# Patient Record
Sex: FEMALE | Race: WHITE | Employment: FULL TIME | URBAN - METROPOLITAN AREA
[De-identification: names, ages, dates, MRNs, and addresses within clinical notes are randomized per-mention and may not be internally consistent; named-entity substitution may affect disease eponyms.]

---

## 2021-06-14 ENCOUNTER — APPOINTMENT (OUTPATIENT)
Dept: PHYSICAL THERAPY | Age: 66
End: 2021-06-14
Payer: COMMERCIAL

## 2021-06-30 ENCOUNTER — HOSPITAL ENCOUNTER (OUTPATIENT)
Dept: PHYSICAL THERAPY | Age: 66
Discharge: HOME | End: 2021-06-30
Payer: COMMERCIAL

## 2021-06-30 PROCEDURE — 97110 THERAPEUTIC EXERCISES: CPT

## 2021-06-30 PROCEDURE — 97162 PT EVAL MOD COMPLEX 30 MIN: CPT

## 2021-06-30 PROCEDURE — 97140 MANUAL THERAPY 1/> REGIONS: CPT

## 2021-06-30 NOTE — PROGRESS NOTES
PT DAILY TREATMENT NOTE    Patient Name: Evy Domínguez  Date:2021  : 1955  [x]  Patient  Verified  Payor: BLUE CROSS / Plan: Mary Lopes [id number] PPO / Product Type: PPO /    In time:1225  Out time:126  Total Treatment Time (min): 61  Total Timed Codes (min): 25  1:1 Treatment Time ( only): 61   Visit #: 1 of     Treatment Area: Abdominal weakness [R19.8]  Rectus diastasis [M62.08]    SUBJECTIVE  Pain Level (0-10 scale): 2  Any medication changes, allergies to medications, adverse drug reactions, diagnosis change, or new procedure performed?: [x] No    [] Yes (see summary sheet for update)  Subjective functional status/changes:   [] No changes reported  Pt initial eval see POC for details    OBJECTIVE      36 min [x]Eval                  []Re-Eval       13 min Therapeutic Exercise:  [] See flow sheet :   Rationale: increase ROM and increase strength to improve the patient's ability to lift, and transfer    12 min Manual Therapy:  stm to JONES lumbar paraspinals   The manual therapy interventions were performed at a separate and distinct time from the therapeutic activities interventions. Rationale: decrease pain, increase ROM and increase tissue extensibility to improve tissue excursion during functional mobility and ADLs              With   [] TE   [] TA   [] neuro   [] other: Patient Education: [x] Review HEP    [] Progressed/Changed HEP based on:   [] positioning   [] body mechanics   [] transfers   [] heat/ice application    [] other:      Other Objective/Functional Measures:    Justification for Eval Code Complexity:  Patient History : see POC   Examination see exam   Clinical Presentation: evolving  Clinical Decision Making : FOTO : 54/100    Pain Level (0-10 scale) post treatment: 1    ASSESSMENT/Changes in Function: Pt was instructed in initial HEP required demo, vc, and tc for all exercises to perform correctly.  Pt was given hand out detailing exercises and instructed in modification of exercises to tolerance, and in performing exercises safely. Pt verbalized understanding. Patient will continue to benefit from skilled PT services to modify and progress therapeutic interventions, address functional mobility deficits, address ROM deficits, address strength deficits, analyze and address soft tissue restrictions, analyze and cue movement patterns, analyze and modify body mechanics/ergonomics, assess and modify postural abnormalities, address imbalance/dizziness and instruct in home and community integration to attain remaining goals.      [x]  See Plan of Care  []  See progress note/recertification  []  See Discharge Summary         Progress towards goals / Updated goals:  No progress as goals were set today    PLAN  []  Upgrade activities as tolerated     []  Continue plan of care  []  Update interventions per flow sheet       []  Discharge due to:_  []  Other:_        Yuridia Foster 6/30/2021  9:12 AM    Future Appointments   Date Time Provider Andria Parrish   6/30/2021 12:15 PM [facility]

## 2021-06-30 NOTE — PROGRESS NOTES
[address] [facility] AT [address] [place], [place]  Phone: [phone] Fax: [phone] / STATEMENT OF MEDICAL NECESSITY FOR PHYSICAL THERAPY SERVICES  Patient Name: Lionel Granado : 1955   Medical   Diagnosis: Abdominal weakness [R19.8]  Rectus diastasis [M62.08] Treatment Diagnosis: Low back pain [M54.5]  Abdominal weakness [R19.8]  Rectus diastasis [M62.08]   Onset Date: ~     Referral Source: Mario Alberto Sinclair MD [facility]): 2021   Prior Hospitalization: See medical history Provider #: [id number]   Prior Level of Function: I in all functional mobility and ADLs   Comorbidities: , hx of breast cancer,    Medications: Verified on Patient Summary List   The Plan of Care and following information is based on the information from the initial evaluation.   ========================================================================    Assessment / key information:  Pt is a 78 y.o. F who presents with low back pain and diastasis recti. Current deficits include: dec hip ROM, dec hip strength, dec spinal ROM, dec dynamic hip and core stability, significant abdominal seperation Functional deficits include: impaired floor transfers, impaired sitting, impaired lifting, impaired ability to participate in exercise/recreational activity. FOTO score indicates 46% functional impairment. Home exercise program initiated on initial evaluation to address these deficits. Pt would benefit from PT to address these deficits for increased functional mobility and QOL. Subjective: Pt reports she was diagnosed with diastasis recti may years ago, and back then they told her not to do anything that caused strain in her abdomen. She feels it started after her first pregnancy. She has a history of 5 pregnancies and 4 live births, with most recent being 25 years ago.  She had a tubal pregnancy in  which resulted in an abdominal surgery. She states all her babies were large (10 lbs). She noticed her abdominals  then, but back then it was just recommended to avoid anything that caused strain in the abdomen. She saw her MD recently who told her that now they recommend doing therapy. She does not want to do surgery at this point if possible. It is worse with sitting up straight, it can be difficult to void her bowels and painful if her bowels are full, it makes it impossible to get up off the ground, and she notes soreness in her lower back. The back pain is worse with increased activity, she denies numbness/tingling/leg pain. She feels the back pain is related to the abdominal separation. She currently has no incontinence issues currently but is worried about having them. She is somewhat sedentary, working full time as a professor at [employer].  She denies all red flags      OBJECTIVE:    PAIN:  Location- low back, abdominals  Current-2  Best-0  Worst-4-6  Alleviating factors: rest  Aggravating factors: inc activity    MMT:  Hip   -flex L=3/5 R=3/5  -ext L=3-/5 R=3-/5  -abd L=3-/5 R=3/5    Core bridge: 25% ROM  Transverse abdominis: fair activation in supine with 3-5s endurance, poor activation in sidelying with noted abdominal bulging  Diastasis recti: significant abdominal separation greatest width 15cm, length extending from lower ribs to belt line ~25cm, noted abdominal doming at separation with headlift     Sensation: light touch grossly intact    Posture: forward head, rounded jones elevated shoulders, mild inc thoracic kyphosis, mild flattened lumbar lordosis    Balance: SLS JONES WNL, inc postural sway with L SLS    AROM:  Lumbar  Flexion- finger tips to foot, no reversal of lumbar lordosis  Extension- WNL  Rotation R- WNL  Rotation L- WNL  Sidebend R- fingertips to knee jt line, mild back pain  Sidebend L-fingertips to knee jt line, mild back pain  Hip  Extension- R=6deg, L=0deg    Outcome Measure: FOTO=54    Palpation for Tenderness: TTP of ema lumbar paraspinals, QL  Segmental mobility: Dec segmental mobility from T8-L1, dec lumbar fascial mobiltiy    Special Tests:  SLR- negative   Slump Test- negative    ========================================================================  Eval Complexity: History: MEDIUM  Complexity : 1-2 comorbidities / personal factors will impact the outcome/ POC Exam:MEDIUM Complexity : 3 Standardized tests and measures addressing body structure, function, activity limitation and / or participation in recreation  Presentation: MEDIUM Complexity : Evolving with changing characteristics  Clinical Decision Making:MEDIUM Complexity : FOTO score of 26-74Overall Complexity:MEDIUM  Problem List: pain affecting function, decrease ROM, decrease strength, impaired gait/ balance, decrease ADL/ functional abilitiies, decrease activity tolerance, decrease flexibility/ joint mobility and decrease transfer abilities   Treatment Plan may include any combination of the following: Therapeutic exercise, Therapeutic activities, Neuromuscular re-education, Physical agent/modality, Gait/balance training, Manual therapy, Patient education, Self Care training, Functional mobility training, Home safety training and Stair training  Patient / Family readiness to learn indicated by: asking questions  Persons(s) to be included in education: patient (P)  Barriers to Learning/Limitations: None  Measures taken:    Patient Goal (s): \"strengthen core\"   Patient self reported health status: fair  Rehabilitation Potential: fair  Short Term Goals: To be accomplished in 1 weeks:  1) Goal: Patient will be independent and compliant with HEP in order to progress toward long term goals. Status at last note/certification: issued and reviewed  Long Term Goals: To be accomplished in 8-12 treatments:  1) Goal: Patient will improve FOTO assessment score to 61 pts in order to indicate improved functional abilities.   Status at last note/certification: 54 pts  2) Goal: Patient will improve JONES hip extension to 20 deg for improved posture and gait mechanics  Status at last note/certification:  V=4KDQ, L=0deg  3) Goal: Patient will report worst back pain as 1/10 or less in order to progress toward personal goals. Status at last note/certification: 5-5/81  4) Goal: Patient will report overall at least 65% improvement in function in order to progress toward premorbid status. Status at last note/certification: n/a  5) Goal: Patient will demonstrate ability to activate transverse abdominis for 10s in quadruped for improved core stability  Status at last note/certification:unable    Frequency / Duration:   Patient to be seen  1-2  times per week for 4-6  weeks:  Patient / Caregiver education and instruction: exercises  Therapist Signature: John Pepe Date: 6/64/8379   Certification Period: na Time: 2:04 PM   ========================================================================  I certify that the above Physical Therapy Services are being furnished while the patient is under my care. I agree with the treatment plan and certify that this therapy is necessary. Physician Signature:        Date:       Time: ___                                            Sarahy Anderson MD.    Please sign and return to [facility] or you may fax the signed copy to [phone]. Thank you.

## 2021-07-07 ENCOUNTER — HOSPITAL ENCOUNTER (OUTPATIENT)
Dept: PHYSICAL THERAPY | Age: 66
Discharge: HOME | End: 2021-07-07
Payer: COMMERCIAL

## 2021-07-07 PROCEDURE — 97110 THERAPEUTIC EXERCISES: CPT | Performed by: GENERAL ACUTE CARE HOSPITAL

## 2021-07-07 PROCEDURE — 97112 NEUROMUSCULAR REEDUCATION: CPT | Performed by: GENERAL ACUTE CARE HOSPITAL

## 2021-07-07 NOTE — PROGRESS NOTES
PT DAILY TREATMENT NOTE     Patient Name: Bridger Lai  Date:2021  : 1955  [x]  Patient  Verified  Payor: BLUE CROSS / Plan: [facility] PPO / Product Type: PPO /    In time:2:00  Out time: 2:56  Total Treatment Time (min): 56  Total Timed Codes (min): 56  1:1 Treatment Time (min): 56   Visit #: 2 of     Treatment Area: Abdominal weakness [R19.8]  Rectus diastasis [M62.08]    SUBJECTIVE  Pain Level (0-10 scale): 0/10  Any medication changes, allergies to medications, adverse drug reactions, diagnosis change, or new procedure performed?: [x] No    [] Yes (see summary sheet for update)  Subjective functional status/changes:   [] No changes reported  Pt has initiated exercises without complaint. OBJECTIVE      31 min Therapeutic Exercise:  [] See flow sheet :   Rationale: increase ROM and increase strength to improve the patients ability to transfer, lift, squat       25 min Neuromuscular Re-education:  []  See flow sheet :   Rationale: increase strength, improve coordination, improve balance and increase proprioception  to improve the patients ability to maintain core stabilization during functional mobility tasks    W/ TE min Patient Education: [x] Review HEP    [] Progressed/Changed HEP based on:   Education throughout session on core strengthening, engagement of TA and obliques, avoiding diastasis protrusion/bulging     Other Objective/Functional Measures:    First FU treatment - initiated exercises per FS    Pain Level (0-10 scale) post treatment: 0/10    ASSESSMENT/Changes in Function: Good tolerance to initial treatment session with patient requiring 100% VC's for all newly introduced exercises. Heavy emphasis on TA contraction during all therex. Pt asking about use of an abdominal binder in order to perform higher level core exercises without diastasis protrusion, but educated on underlying concern of lack of adequate TA strength.  Pt will benefit from ongoing core and hip strengthening with further education as needed. Patient will continue to benefit from skilled PT services to modify and progress therapeutic interventions, address functional mobility deficits, address ROM deficits, address strength deficits, analyze and address soft tissue restrictions, analyze and cue movement patterns, analyze and modify body mechanics/ergonomics, assess and modify postural abnormalities, address imbalance/dizziness and instruct in home and community integration to attain remaining goals. [x]  See Plan of Care  []  See progress note/recertification  []  See Discharge Summary         Progress towards goals / Updated goals:  Short Term Goals: To be accomplished in 1 weeks:  1) Goal: Patient will be independent and compliant with HEP in order to progress toward long term goals. Status at last note/certification: issued and reviewed  Current: pt has initiated HEP (7/7/21)  Long Term Goals: To be accomplished in 8-12 treatments:  1) Goal: Patient will improve FOTO assessment score to 61 pts in order to indicate improved functional abilities. Status at last note/certification: 54 pts  2) Goal: Patient will improve JONES hip extension to 20 deg for improved posture and gait mechanics  Status at last note/certification:  [id number], L=0deg  3) Goal: Patient will report worst back pain as 1/10 or less in order to progress toward personal goals. Status at last note/certification: 8-7/29  4) Goal: Patient will report overall at least 65% improvement in function in order to progress toward premorbid status.   Status at last note/certification: n/a  5) Goal: Patient will demonstrate ability to activate transverse abdominis for 10s in quadruped for improved core stability  Status at last note/certification:unable    PLAN  [x]  Upgrade activities as tolerated     []  Continue plan of care  []  Update interventions per flow sheet       []  Discharge due to:_  [x]  Other:_  Progress as tolerated, update HEP     Sharon Hyde, PT 7/7/2021  8:52 AM

## 2021-07-12 ENCOUNTER — HOSPITAL ENCOUNTER (OUTPATIENT)
Dept: PHYSICAL THERAPY | Age: 66
Discharge: HOME | End: 2021-07-12
Payer: COMMERCIAL

## 2021-07-12 PROCEDURE — 97112 NEUROMUSCULAR REEDUCATION: CPT

## 2021-07-12 PROCEDURE — 97110 THERAPEUTIC EXERCISES: CPT

## 2021-07-12 PROCEDURE — 97140 MANUAL THERAPY 1/> REGIONS: CPT

## 2021-07-12 NOTE — PROGRESS NOTES
PT DAILY TREATMENT NOTE     Patient Name: Sugey Rodríguez  Date:2021  : 1955  [x]  Patient  Verified  Payor: BLUE ADRIANA / Plan: Mary Lopes [id number] PPO / Product Type: PPO /    In time: 9437 Out time: 1139  Total Treatment Time (min): 54  Total Timed Codes (min): 54  1:1 Treatment Time (min): 54  Visit #: 3 of     Treatment Area: Abdominal weakness [R19.8]  Rectus diastasis [M62.08]    SUBJECTIVE  Pain Level (0-10 scale): 0/10  Any medication changes, allergies to medications, adverse drug reactions, diagnosis change, or new procedure performed?: [x] No    [] Yes (see summary sheet for update)  Subjective functional status/changes:   [] No changes reported  Pt reports she has been working on her exercises at home and practicing better posture as she is able    OBJECTIVE      21 min Therapeutic Exercise:  [] See flow sheet :   Rationale: increase ROM and increase strength to improve the patients ability to transfer, lift, squat       23 min Neuromuscular Re-education:  []  See flow sheet :   Rationale: increase strength, improve coordination, improve balance and increase proprioception  to improve the patients ability to maintain core stabilization during functional mobility tasks    10 min Manual Therapy:  STM to ema lumbar paraspinals, skin rolling to L lumbar quadrant, Gr III P/A upglides from T8-12 to improve thoracic extension   The manual therapy interventions were performed at a separate and distinct time from the therapeutic activities interventions. Rationale: increase ROM and increase tissue extensibility to improve postural alignment      W/ TE min Patient Education: [x] Review HEP    [] Progressed/Changed HEP based on:   Education throughout session on core strengthening, engagement of TA and obliques, avoiding diastasis protrusion/bulging     Other Objective/Functional Measures:     Added prone hip ext    Pain Level (0-10 scale) post treatment: 0/10    ASSESSMENT/Changes in Function:    Pt was able to tolerate exercises from last visit without complaint. She was also instructed in positional TA strength progression of going from supine to prone, I.e. gravity assisted to gravity resisted. Pt verbalized understanding. She demonstrated improved TA contraction and control since initial eval and is progressing well. PT considering adding quadruped exercises NV    Patient will continue to benefit from skilled PT services to modify and progress therapeutic interventions, address functional mobility deficits, address ROM deficits, address strength deficits, analyze and address soft tissue restrictions, analyze and cue movement patterns, analyze and modify body mechanics/ergonomics, assess and modify postural abnormalities, address imbalance/dizziness and instruct in home and community integration to attain remaining goals. [x]  See Plan of Care  []  See progress note/recertification  []  See Discharge Summary         Progress towards goals / Updated goals:  Short Term Goals: To be accomplished in 1 weeks:  1) Goal: Patient will be independent and compliant with HEP in order to progress toward long term goals. Status at last note/certification: issued and reviewed  Current: pt has initiated HEP (7/7/21)  Long Term Goals: To be accomplished in 8-12 treatments:  1) Goal: Patient will improve FOTO assessment score to 61 pts in order to indicate improved functional abilities. Status at last note/certification: 54 pts  2) Goal: Patient will improve JONES hip extension to 20 deg for improved posture and gait mechanics  Status at last note/certification:  O=2KIG, L=0deg added prone hip ext today 7/12/21  3) Goal: Patient will report worst back pain as 1/10 or less in order to progress toward personal goals. Status at last note/certification: 1-2/59  4) Goal: Patient will report overall at least 65% improvement in function in order to progress toward premorbid status.   Status at last note/certification: n/a  5) Goal: Patient will demonstrate ability to activate transverse abdominis for 10s in quadruped for improved core stability  Status at last note/certification:unable    PLAN  [x]  Upgrade activities as tolerated     []  Continue plan of care  []  Update interventions per flow sheet       []  Discharge due to:_  [x]  Other:_  Progress as tolerated, update HEP RAUL Durham Mouse 7/12/2021  12:59 PM    Future Appointments   Date Time Provider Andria Parrish   7/15/2021  2:00 PM [facility]   7/20/2021  2:45 PM [facility] PT [facility]   7/22/2021  2:00 PM Altamese Camel, PT [facility]   7/27/2021  3:30 PM [facility] PT [facility]   7/29/2021  2:00 PM Altamese Camel, PT [facility]   8/3/2021  2:00 PM [facility] PT [facility]   8/5/2021  2:00 PM [facility] PT [facility]   8/10/2021  2:00 PM [facility] PT [facility]   8/12/2021  2:00 PM [facility] PT [facility]

## 2021-07-15 ENCOUNTER — HOSPITAL ENCOUNTER (OUTPATIENT)
Dept: PHYSICAL THERAPY | Age: 66
Discharge: HOME | End: 2021-07-15
Payer: COMMERCIAL

## 2021-07-15 PROCEDURE — 97110 THERAPEUTIC EXERCISES: CPT | Performed by: GENERAL ACUTE CARE HOSPITAL

## 2021-07-15 PROCEDURE — 97112 NEUROMUSCULAR REEDUCATION: CPT | Performed by: GENERAL ACUTE CARE HOSPITAL

## 2021-07-15 NOTE — PROGRESS NOTES
PT DAILY TREATMENT NOTE     Patient Name: Dominik Esquivel  Date:7/15/2021  : 1955  [x]  Patient  Verified  Payor: BLUE CROSS / Plan: [facility] PPO / Product Type: PPO /    In time: 2:00   Out time: 2:48  Total Treatment Time (min): 48  Total Timed Codes (min): 48  1:1 Treatment Time (min): 48   Visit #: 4 of     Treatment Area: Abdominal weakness [R19.8]  Rectus diastasis [M62.08]    SUBJECTIVE  Pain Level (0-10 scale): 0/10   Any medication changes, allergies to medications, adverse drug reactions, diagnosis change, or new procedure performed?: [x] No    [] Yes (see summary sheet for update)  Subjective functional status/changes:   [] No changes reported  Pt reports no issues at this time, HEP going well. Ongoing complaint of L sided LBP. OBJECTIVE      25 min Therapeutic Exercise:  [] See flow sheet :   Rationale: increase ROM and increase strength to improve the patients ability to transfer, lift, squat       23 min Neuromuscular Re-education:  []  See flow sheet :   Rationale: increase strength, improve coordination, improve balance and increase proprioception  to improve the patients ability to maintain core stabilization during functional mobility tasks    NI min Manual Therapy:  STM to ema lumbar paraspinals, skin rolling to L lumbar quadrant, Gr III P/A upglides from T8-12 to improve thoracic extension   The manual therapy interventions were performed at a separate and distinct time from the therapeutic activities interventions. Rationale: increase ROM and increase tissue extensibility to improve postural alignment      W/ TE min Patient Education: [x] Review HEP    [] Progressed/Changed HEP based on: Other Objective/Functional Measures: Added 3way SB roll out, QP TA, QP arm/leg lifts --> birddog, wall ball squats with GTB at knees.      Pain Level (0-10 scale) post treatment: 0/10     ASSESSMENT/Changes in Function:   Good tolerance to progression of exercises today for core and hip strengthening. VC/TC to correct sidelying hip ABD for full sidelying (versus up on elbow) and bringing leg back to prevent TFL compensation. Added lumbar stretching to address co L sided LS irritation. Improving TA contraction during dynamic tasks. Pt has begun going the gym for additional exercise. Patient will continue to benefit from skilled PT services to modify and progress therapeutic interventions, address functional mobility deficits, address ROM deficits, address strength deficits, analyze and address soft tissue restrictions, analyze and cue movement patterns, analyze and modify body mechanics/ergonomics, assess and modify postural abnormalities, address imbalance/dizziness and instruct in home and community integration to attain remaining goals. [x]  See Plan of Care  []  See progress note/recertification  []  See Discharge Summary         Progress towards goals / Updated goals:  Short Term Goals: To be accomplished in 1 weeks:  1) Goal: Patient will be independent and compliant with HEP in order to progress toward long term goals. Status at last note/certification: issued and reviewed  Current: pt has initiated HEP (7/7/21)  Long Term Goals: To be accomplished in 8-12 treatments:  1) Goal: Patient will improve FOTO assessment score to 61 pts in order to indicate improved functional abilities. Status at last note/certification: 54 pts  2) Goal: Patient will improve JONES hip extension to 20 deg for improved posture and gait mechanics  Status at last note/certification:  N=9YNR, L=0deg added prone hip ext today 7/12/21  3) Goal: Patient will report worst back pain as 1/10 or less in order to progress toward personal goals. Status at last note/certification: 0-0/29  4) Goal: Patient will report overall at least 65% improvement in function in order to progress toward premorbid status.   Status at last note/certification: n/a  5) Goal: Patient will demonstrate ability to activate transverse abdominis for 10s in quadruped for improved core stability  Status at last note/certification:unable    PLAN  [x]  Upgrade activities as tolerated     []  Continue plan of care  []  Update interventions per flow sheet       []  Discharge due to:_  []  Other:_        Deepthi Cortez, PT 7/15/2021  12:59 PM    Future Appointments   Date Time Provider Andria Parrish   7/15/2021  2:00 PM [address] 2 [facility] [address]   7/20/2021  2:45 PM [address] PT [facility] 2 [facility] [address]   7/22/2021  2:00 PM Rhodia Donavon, PT [facility] [address]   7/27/2021  3:30 PM [address] PT [facility] 2 [facility] [address]   7/29/2021  2:00 PM Rhodia Donavon, PT [facility] [address]   8/3/2021  2:00 PM [address] PT [facility] 2 [facility] [address]   8/5/2021  2:00 PM [address] PT [facility] 2 [facility] [address]   8/10/2021  2:00 PM [address] PT [facility] 2 [facility] [address]   8/12/2021  2:00 PM [address] PT [facility] 2 [facility] [address]

## 2021-07-20 ENCOUNTER — HOSPITAL ENCOUNTER (OUTPATIENT)
Dept: PHYSICAL THERAPY | Age: 66
Discharge: HOME | End: 2021-07-20
Payer: COMMERCIAL

## 2021-07-20 PROCEDURE — 97140 MANUAL THERAPY 1/> REGIONS: CPT | Performed by: GENERAL ACUTE CARE HOSPITAL

## 2021-07-20 PROCEDURE — 97110 THERAPEUTIC EXERCISES: CPT | Performed by: GENERAL ACUTE CARE HOSPITAL

## 2021-07-20 PROCEDURE — 97112 NEUROMUSCULAR REEDUCATION: CPT | Performed by: GENERAL ACUTE CARE HOSPITAL

## 2021-07-20 NOTE — PROGRESS NOTES
PT DAILY TREATMENT NOTE     Patient Name: Sharri Crane  Date:2021  : 1955  [x]  Patient  Verified  Payor: BLUE CROSS / Plan: [facility] [id number] PPO / Product Type: PPO /    In time:  2:45   Out time: 3:24   Total Treatment Time (min): 39  Total Timed Codes (min): 39  1:1 Treatment Time (min): 39  Visit #: 5 of     Treatment Area: Abdominal weakness [R19.8]  Rectus diastasis [M62.08]    SUBJECTIVE  Pain Level (0-10 scale): 510    Any medication changes, allergies to medications, adverse drug reactions, diagnosis change, or new procedure performed?: [x] No    [] Yes (see summary sheet for update)  Subjective functional status/changes:   [] No changes reported  Pt reports significant muscle soreness in legs and back after several days of working in the yard. OBJECTIVE      8 min Therapeutic Exercise:  [] See flow sheet :   Rationale: increase ROM and increase strength to improve the patients ability to transfer, lift, squat       15 min Neuromuscular Re-education:  []  See flow sheet : core re-ed   Rationale: increase strength, improve coordination, improve balance and increase proprioception  to improve the patients ability to maintain core stabilization during functional mobility tasks    15 min Manual Therapy: STM to ema lumbar paraspinals f/b massage gun to LS paraspinals, upper glutes, thoracic parapsinals, periscapular mm    The manual therapy interventions were performed at a separate and distinct time from the therapeutic activities interventions. Rationale: increase ROM and increase tissue extensibility to improve postural alignment      W/ TE min Patient Education: [x] Review HEP    [] Progressed/Changed HEP based on: Other Objective/Functional Measures:        Pain Level (0-10 scale) post treatment: 4/10    ASSESSMENT/Changes in Function:   Modified today's session secondary to complaints of significant muscle soreness today.  Pt unable to tolerate the majority of usual therex. Continued to focus on supine TA strengthening as tolerated. Pt reported positive response to manual therapy to reduce muscle stiffness. Plan to resume prior exercise routine next visit per tolerance. Patient will continue to benefit from skilled PT services to modify and progress therapeutic interventions, address functional mobility deficits, address ROM deficits, address strength deficits, analyze and address soft tissue restrictions, analyze and cue movement patterns, analyze and modify body mechanics/ergonomics, assess and modify postural abnormalities, address imbalance/dizziness and instruct in home and community integration to attain remaining goals. [x]  See Plan of Care  []  See progress note/recertification  []  See Discharge Summary         Progress towards goals / Updated goals:  Short Term Goals: To be accomplished in 1 weeks:  1) Goal: Patient will be independent and compliant with HEP in order to progress toward long term goals. Status at last note/certification: issued and reviewed  Current: pt has initiated HEP (7/7/21)  Long Term Goals: To be accomplished in 8-12 treatments:  1) Goal: Patient will improve FOTO assessment score to 61 pts in order to indicate improved functional abilities. Status at last note/certification: 54 pts  2) Goal: Patient will improve JONES hip extension to 20 deg for improved posture and gait mechanics  Status at last note/certification:  Q=7UDU, L=0deg added prone hip ext today 7/12/21  3) Goal: Patient will report worst back pain as 1/10 or less in order to progress toward personal goals. Status at last note/certification: 8-9/02  4) Goal: Patient will report overall at least 65% improvement in function in order to progress toward premorbid status.   Status at last note/certification: n/a  5) Goal: Patient will demonstrate ability to activate transverse abdominis for 10s in quadruped for improved core stability  Status at last note/certification:unable    PLAN  [x]  Upgrade activities as tolerated     []  Continue plan of care  []  Update interventions per flow sheet       []  Discharge due to:_  [x]  Other:_  Resume core and hip strengthening RAUL Aranda PT 7/20/2021  12:59 PM    Future Appointments   Date Time Provider Andria Parrish   7/20/2021  2:45 PM [address] PT [facility] 2 [facility] [address]   7/22/2021  2:00 PM Gab Rosales PT [facility] [address]   7/27/2021  3:30 PM [address] PT [facility] 2 [facility] [address]   7/29/2021  2:00 PM Gab Rosales PT [facility] [address]   8/3/2021  2:00 PM [address] PT [facility] 2 [facility] [address]   8/5/2021  2:00 PM [address] PT [facility] 2 [facility] [address]   8/10/2021  2:00 PM [address] PT [facility] 2 [facility] [address]   8/12/2021  2:00 PM [address] PT [facility] 2 [facility] [address]

## 2021-07-22 ENCOUNTER — HOSPITAL ENCOUNTER (OUTPATIENT)
Dept: PHYSICAL THERAPY | Age: 66
End: 2021-07-22
Payer: COMMERCIAL

## 2021-07-27 ENCOUNTER — APPOINTMENT (OUTPATIENT)
Dept: PHYSICAL THERAPY | Age: 66
End: 2021-07-27
Payer: COMMERCIAL

## 2021-07-29 ENCOUNTER — APPOINTMENT (OUTPATIENT)
Dept: PHYSICAL THERAPY | Age: 66
End: 2021-07-29
Payer: COMMERCIAL

## 2021-08-03 ENCOUNTER — APPOINTMENT (OUTPATIENT)
Dept: PHYSICAL THERAPY | Age: 66
End: 2021-08-03

## 2021-08-05 ENCOUNTER — APPOINTMENT (OUTPATIENT)
Dept: PHYSICAL THERAPY | Age: 66
End: 2021-08-05

## 2021-08-10 ENCOUNTER — APPOINTMENT (OUTPATIENT)
Dept: PHYSICAL THERAPY | Age: 66
End: 2021-08-10

## 2021-08-12 ENCOUNTER — APPOINTMENT (OUTPATIENT)
Dept: PHYSICAL THERAPY | Age: 66
End: 2021-08-12

## 2021-09-21 NOTE — PROGRESS NOTES
[facility] AT [address], [place], [place]  Phone: [phone] Fax 21 [phone] SUMMARY  Patient Name: Amisha Dyer : 1955   Treatment/Medical Diagnosis: Abdominal weakness [R19.8]  Rectus diastasis [M62.08]   Referral Source: Kylee Zhou MD     Date of Initial Visit: 21 Attended Visits: 5 Missed Visits: 0     SUMMARY OF TREATMENT   Pt is a 78 y.o. F who presents with low back pain and diastasis recti. Treatment Plan may include any combination of the following: Therapeutic exercise, Therapeutic activities, Neuromuscular re-education, Physical agent/modality, Gait/balance training, Manual therapy, Patient education, Self Care training, Functional mobility training, Home safety training and Stair training. CURRENT STATUS  Patient was seen for 5 skilled PT sessions focusing on impairments related to low back and diastasis recti. Pt self discharged as she is going out of town and is unsure when she will be back. Pt has been given an updated HEP and thorough education throughout all treatment sessions regarding proper abdominal strengthening. Please send a new referral if further therapy is indicated in the future. RECOMMENDATIONS  Discontinue therapy. Pt self discharged at this time    If you have any questions/comments please contact us directly at [phone]. Thank you for allowing us to assist in the care of your patient.   Therapist Signature: Damaris Donnelly PT Date: 21   Reporting Period: na Time: 2:37 PM

## 2023-07-26 ENCOUNTER — HOSPITAL ENCOUNTER (OUTPATIENT)
Facility: HOSPITAL | Age: 68
Setting detail: RECURRING SERIES
Discharge: HOME | End: 2023-07-29
Payer: COMMERCIAL

## 2023-07-26 PROCEDURE — 97535 SELF CARE MNGMENT TRAINING: CPT

## 2023-07-26 PROCEDURE — 97162 PT EVAL MOD COMPLEX 30 MIN: CPT

## 2023-07-26 PROCEDURE — 97016 VASOPNEUMATIC DEVICE THERAPY: CPT

## 2023-07-26 NOTE — PROGRESS NOTES
PHYSICAL / OCCUPATIONAL THERAPY - DAILY TREATMENT NOTE (updated )    Patient Name: Awilda Gottron    Date: 2023    : 1955  [facility]: [facility] [address] [facility] [address] / Plan: Dane Strong / Product Type: *No Product type* /      Patient  verified Yes     Visit #   Current / Total 1 10   Time   In / Out 1:04 1:44   Pain   In / Out 5 4   Subjective Functional Status/Changes: See IE     TREATMENT AREA =  Right knee pain [M25.561]    OBJECTIVE    Modalities Rationale:     decrease inflammation and decrease pain to improve patient's ability to progress to PLOF and address remaining functional goals. min [] Estim Unattended, type/location:                                      []  w/ice    []  w/heat    min [] Estim Attended, type/location:                                     []  w/US     []  w/ice    []  w/heat    []  TENS insruct      min []  Mechanical Traction: type/lbs                   []  pro   []  sup   []  int   []  cont    []  before manual    []  after manual    min []  Ultrasound, settings/location:      min []  Iontophoresis w/ dexamethasone, location:                                               []  take home patch       []  in clinic    min  unbill []  Ice     []  Heat    location/position:     min []  Paraffin,  details:    10 min []  Vasopneumatic Device, press/temp: Low/med Game Ready right knee    min []  Shaq Kimball / Crissy Lasso: If using vaso (only need to measure limb vaso being performed on)      pre-treatment girth : 36 cm      post-treatment girth : 35.8 cm      measured at (landmark location) :  right mid-patella    min []  Other:    Skin assessment post-treatment (if applicable):    [x]  intact    []  redness- no adverse reaction                 []redness - adverse reaction:        Therapeutic Procedures:   Tx Min Billable or 1:1 Min (if diff from Tx Min) Procedure, Rationale, Specifics   8 8 29921 Self Care/Home Management (timed):  improve patient knowledge and understanding of pain reducing
Goals will be assigned and reassessed every 10 visits/ 30 days per guidelines . Patient/ Caregiver education and instruction: Diagnosis, prognosis, self care, activity modification, and exercises [x]  Plan of care has been reviewed with PTA    Certification Period: SKINNY    Tiajuana Riedel, PT       7/26/2023       1:01 PM  ===================================================================  I certify that the above Therapy Services are being furnished while the patient is under my care. I agree with the treatment plan and certify that this therapy is necessary. Physician's Signature:_________________________   DATE:_________   TIME:________                           Sue Barker PA-C    ** Signature, Date and Time must be completed for valid certification **  [facility] or you may fax the signed copy to [phone]. Thank you.

## 2023-08-01 ENCOUNTER — HOSPITAL ENCOUNTER (OUTPATIENT)
Facility: HOSPITAL | Age: 68
Setting detail: RECURRING SERIES
Discharge: HOME | End: 2023-08-04
Payer: COMMERCIAL

## 2023-08-01 PROCEDURE — 97112 NEUROMUSCULAR REEDUCATION: CPT

## 2023-08-01 PROCEDURE — 97530 THERAPEUTIC ACTIVITIES: CPT

## 2023-08-01 PROCEDURE — 97110 THERAPEUTIC EXERCISES: CPT

## 2023-08-01 NOTE — PROGRESS NOTES
Fidencio Newman, PT [facility]   8/7/2023 11:00 AM Sundra Osler, PT [facility]   8/10/2023  1:00 PM Fidencio Newman, PT [facility]   8/21/2023  1:00 PM [facility] PT [facility] 2 [facility]   8/21/2023  2:00 PM Say Fu MD [facility]   8/24/2023  1:00 PM Fidencio Newman, PT [facility]   8/28/2023  1:00 PM Ula Romberg, PT [facility]   8/31/2023  1:00 PM Tato Xiong, PTA [facility]

## 2023-08-03 ENCOUNTER — APPOINTMENT (OUTPATIENT)
Facility: HOSPITAL | Age: 68
End: 2023-08-03
Payer: COMMERCIAL

## 2023-08-07 ENCOUNTER — HOSPITAL ENCOUNTER (OUTPATIENT)
Facility: HOSPITAL | Age: 68
Setting detail: RECURRING SERIES
Discharge: HOME | End: 2023-08-10
Payer: COMMERCIAL

## 2023-08-07 PROCEDURE — 97112 NEUROMUSCULAR REEDUCATION: CPT

## 2023-08-07 PROCEDURE — 97110 THERAPEUTIC EXERCISES: CPT

## 2023-08-07 PROCEDURE — 97530 THERAPEUTIC ACTIVITIES: CPT

## 2023-08-07 NOTE — PROGRESS NOTES
minutes)    55 55 [facility] Totals Reminder: bill using total billable min of TIMED therapeutic procedures (example: do not include dry needle or estim unattended, both untimed codes, in totals to left)  8-22 min = 1 unit; 23-37 min = 2 units; 38-52 min = 3 units; 53-67 min = 4 units; 68-82 min = 5 units   Total Total     [x]  Patient Education billed concurrently with other procedures  [x] Review HEP    [x] Progressed/Changed HEP, detail:  Thorough discussion of management of back pain  Self-traction on floor with LE on chair at 90/90 position   [facility] (pull from posterior thigh instead of tibia to avoid knee flexion / compression   Hip flexor - ismael stretch   [] Other detail:    Objective Information/Functional Measures/Assessment:    R knee PROM : grossly lacking 5 deg   MSR L (at bunion) 25\"; R 30\"    R hallus valgus observed    Back positions of comfort: SL with pillow between knees  Back pain: worse with walking; pt has significantly +_ diastasis rectus and has been seen for PT for back pain/abdominal weakness. She has re-initiated her HEP for that strength work     Discussed importance of achieving R knee extension to 0 deg for normalized gait and resolution of back pain (anticipated)    Patient will continue to benefit from skilled PT / OT services to modify and progress therapeutic interventions, analyze and address functional mobility deficits, analyze and address ROM deficits, analyze and address strength deficits, analyze and address soft tissue restrictions, analyze and cue for proper movement patterns, and analyze and modify for postural abnormalities to address functional deficits and attain remaining goals. Progress toward goals / Updated goals:  []  See Progress Note/Recertification    Short Term Goals: To be accomplished in 4 treatments  Patient will report daily compliance with HEP to improve tolerance to ADLs and ambulation.   Status at last assessment: provided HEP at   Current: diligent

## 2023-08-10 ENCOUNTER — HOSPITAL ENCOUNTER (OUTPATIENT)
Facility: HOSPITAL | Age: 68
Setting detail: RECURRING SERIES
Discharge: HOME | End: 2023-08-13
Payer: COMMERCIAL

## 2023-08-10 PROCEDURE — 97535 SELF CARE MNGMENT TRAINING: CPT

## 2023-08-10 PROCEDURE — 97530 THERAPEUTIC ACTIVITIES: CPT

## 2023-08-10 PROCEDURE — 97110 THERAPEUTIC EXERCISES: CPT

## 2023-08-10 NOTE — PROGRESS NOTES
PHYSICAL / OCCUPATIONAL THERAPY - DAILY TREATMENT NOTE (updated )    Patient Name: Oxana Hayes    Date: 8/10/2023    : 1955  Insurance: Payor: Autumn Cole / Plan: [employer] / Product Type: *No Product type* /      Patient  verified Yes     Visit #   Current / Total 4 10   Time   In / Out 1:00 PM 1:50 PM   Pain   In / Out 2/10 010   Subjective Functional Status/Changes: \"I still feel like I am walking funky. And I wouldn't say I am in a lot of pain right now but I can feel it in my knee and my back. \"      TREATMENT AREA =  Right knee pain [M25.561]    OBJECTIVE    Modalities Rationale:     decrease inflammation, decrease pain, and increase tissue extensibility to improve patient's ability to progress to PLOF and address remaining functional goals. min [] Estim Unattended, type/location:                                      []  w/ice    []  w/heat    min [] Estim Attended, type/location:                                     []  w/US     []  w/ice    []  w/heat    []  TENS insruct      min []  Mechanical Traction: type/lbs                   []  pro   []  sup   []  int   []  cont    []  before manual    []  after manual    min []  Ultrasound, settings/location:      min []  Iontophoresis w/ dexamethasone, location:                                               []  take home patch       []  in clinic   10 min  unbill [x]  Ice     [x]  Heat    location/position: Heat to posterior knee & lumbar spine in hooklying with ice to anterior knee     min []  Paraffin,  details:     min []  Vasopneumatic Device, press/temp:     min []  Starleen Ubaldo / Verlon Brightly:     If using vaso (only need to measure limb vaso being performed on)      pre-treatment girth :       post-treatment girth :       measured at (landmark location) :      min []  Other:    Skin assessment post-treatment (if applicable):    []  intact    []  redness- no adverse reaction                 []redness - adverse reaction:         Therapeutic

## 2023-08-15 ENCOUNTER — APPOINTMENT (OUTPATIENT)
Facility: HOSPITAL | Age: 68
End: 2023-08-15
Payer: COMMERCIAL

## 2023-08-21 ENCOUNTER — HOSPITAL ENCOUNTER (OUTPATIENT)
Age: 68
Discharge: HOME | End: 2023-08-24
Payer: COMMERCIAL

## 2023-08-21 ENCOUNTER — OFFICE VISIT (OUTPATIENT)
Age: 68
End: 2023-08-21
Payer: COMMERCIAL

## 2023-08-21 ENCOUNTER — APPOINTMENT (OUTPATIENT)
Facility: HOSPITAL | Age: 68
End: 2023-08-21
Payer: COMMERCIAL

## 2023-08-21 VITALS — HEIGHT: 66 IN | WEIGHT: 170 LBS | BODY MASS INDEX: 27.32 KG/M2

## 2023-08-21 DIAGNOSIS — Z01.818 PRE-OP TESTING: Primary | ICD-10-CM

## 2023-08-21 DIAGNOSIS — G89.29 CHRONIC PAIN OF RIGHT KNEE: ICD-10-CM

## 2023-08-21 DIAGNOSIS — M17.12 OSTEOARTHRITIS OF LEFT KNEE, UNSPECIFIED OSTEOARTHRITIS TYPE: ICD-10-CM

## 2023-08-21 DIAGNOSIS — M25.562 LEFT KNEE PAIN, UNSPECIFIED CHRONICITY: ICD-10-CM

## 2023-08-21 DIAGNOSIS — M17.11 PRIMARY OSTEOARTHRITIS OF RIGHT KNEE: ICD-10-CM

## 2023-08-21 DIAGNOSIS — Z01.818 PRE-OP TESTING: ICD-10-CM

## 2023-08-21 DIAGNOSIS — M25.561 CHRONIC PAIN OF RIGHT KNEE: ICD-10-CM

## 2023-08-21 LAB
ANION GAP SERPL CALC-SCNC: 4 MMOL/L (ref 3–18)
BUN SERPL-MCNC: 12 MG/DL (ref 7–18)
BUN/CREAT SERPL: 13 (ref 12–20)
CA-I BLD-MCNC: 9.4 MG/DL (ref 8.5–10.1)
CHLORIDE SERPL-SCNC: 109 MMOL/L (ref 100–111)
CO2 SERPL-SCNC: 28 MMOL/L (ref 21–32)
CREAT SERPL-MCNC: 0.89 MG/DL (ref 0.6–1.3)
EKG ATRIAL RATE: 50 BPM
EKG DIAGNOSIS: NORMAL
EKG P AXIS: 68 DEGREES
EKG P-R INTERVAL: 164 MS
EKG Q-T INTERVAL: 470 MS
EKG QRS DURATION: 100 MS
EKG QTC CALCULATION (BAZETT): 428 MS
EKG R AXIS: -43 DEGREES
EKG T AXIS: 28 DEGREES
EKG VENTRICULAR RATE: 50 BPM
ERYTHROCYTE [DISTWIDTH] IN BLOOD BY AUTOMATED COUNT: 12.8 % (ref 11.6–14.5)
GLUCOSE SERPL-MCNC: 100 MG/DL (ref 74–99)
HCT VFR BLD AUTO: 37.8 % (ref 35–45)
HGB BLD-MCNC: 12.6 G/DL (ref 12–16)
MCH RBC QN AUTO: 29.9 PG (ref 24–34)
MCHC RBC AUTO-ENTMCNC: 33.3 G/DL (ref 31–37)
MCV RBC AUTO: 89.6 FL (ref 78–100)
NRBC # BLD: 0 K/UL (ref 0–0.01)
NRBC BLD-RTO: 0 PER 100 WBC
PLATELET # BLD AUTO: 236 K/UL (ref 135–420)
PMV BLD AUTO: 9.5 FL (ref 9.2–11.8)
POTASSIUM SERPL-SCNC: 4.7 MMOL/L (ref 3.5–5.5)
RBC # BLD AUTO: 4.22 M/UL (ref 4.2–5.3)
SODIUM SERPL-SCNC: 141 MMOL/L (ref 136–145)
WBC # BLD AUTO: 5.8 K/UL (ref 4.6–13.2)

## 2023-08-21 PROCEDURE — 71046 X-RAY EXAM CHEST 2 VIEWS: CPT

## 2023-08-21 PROCEDURE — 1123F ACP DISCUSS/DSCN MKR DOCD: CPT | Performed by: ORTHOPAEDIC SURGERY

## 2023-08-21 PROCEDURE — 99204 OFFICE O/P NEW MOD 45 MIN: CPT | Performed by: ORTHOPAEDIC SURGERY

## 2023-08-21 PROCEDURE — 80048 BASIC METABOLIC PNL TOTAL CA: CPT

## 2023-08-21 PROCEDURE — 93005 ELECTROCARDIOGRAM TRACING: CPT

## 2023-08-21 PROCEDURE — 85027 COMPLETE CBC AUTOMATED: CPT

## 2023-08-21 RX ORDER — ATORVASTATIN CALCIUM 20 MG/1
TABLET, FILM COATED ORAL
COMMUNITY
Start: 2023-07-23

## 2023-08-22 LAB
BACTERIA SPEC CULT: NORMAL
BACTERIA SPEC CULT: NORMAL
Lab: NORMAL

## 2023-08-23 ENCOUNTER — HOSPITAL ENCOUNTER (OUTPATIENT)
Facility: HOSPITAL | Age: 68
Setting detail: RECURRING SERIES
Discharge: HOME | End: 2023-08-26
Payer: COMMERCIAL

## 2023-08-23 PROCEDURE — 97110 THERAPEUTIC EXERCISES: CPT

## 2023-08-23 PROCEDURE — 97530 THERAPEUTIC ACTIVITIES: CPT

## 2023-08-23 PROCEDURE — 97112 NEUROMUSCULAR REEDUCATION: CPT

## 2023-08-23 NOTE — PROGRESS NOTES
PHYSICAL / OCCUPATIONAL THERAPY - DAILY TREATMENT NOTE (updated )    Patient Name: Kathleen Jimenez    Date: 2023    : 1955  Insurance: Payor: Viola Santiago / Plan: Eder King / Product Type: *No Product type* /      Patient  verified yes     Visit #   Current / Total 5 10 Total Time   Time   In / Out 8:21 9:19 58   Pain   In / Out 1-2 1-2    Subjective Functional Status/Changes: I am not sure if I should get the surgery or not. TREATMENT AREA =  Right knee pain [M25.561]    OBJECTIVE    Therapeutic Procedures:  61  Total 61  Total [facility] Totals Reminder: bill using total billable min of TIMED therapeutic procedures (example: do not include dry needle or estim unattended, both untimed codes, in totals to left)  8-22 min = 1 unit; 23-37 min = 2 units; 38-52 min = 3 units; 53-67 min = 4 units; 68-82 min = 5 units   Tx Min Billable or 1:1 Min (if diff from Tx Min) Procedure, Rationale, Specifics   30 30 61049 Therapeutic Exercise (timed):  increase ROM, strength, coordination, balance, and proprioception to improve patient's ability to progress to PLOF and address remaining functional goals. (see flow sheet as applicable)   Details if applicable:       15 15 [id number] Therapeutic Activity (timed):  use of dynamic activities replicating functional movements to increase ROM, strength, coordination, balance, and proprioception in order to improve patient's ability to progress to PLOF and address remaining functional goals. (see flow sheet as applicable)   Details if applicable:  diastastic recti     14 14 65433 Neuromuscular Re-Education (timed):  improve balance, coordination, kinesthetic sense, posture, core stability and proprioception to improve patient's ability to develop conscious control of individual muscles and awareness of position of extremities in order to progress to PLOF and address remaining functional goals.  (see flow sheet as applicable)     Details if applicable:           [x]  Patient

## 2023-08-28 ENCOUNTER — HOSPITAL ENCOUNTER (OUTPATIENT)
Facility: HOSPITAL | Age: 68
Setting detail: RECURRING SERIES
Discharge: HOME | End: 2023-08-31
Payer: COMMERCIAL

## 2023-08-28 PROCEDURE — 97110 THERAPEUTIC EXERCISES: CPT

## 2023-08-28 PROCEDURE — 97112 NEUROMUSCULAR REEDUCATION: CPT

## 2023-08-28 PROCEDURE — 97016 VASOPNEUMATIC DEVICE THERAPY: CPT

## 2023-08-28 PROCEDURE — 97530 THERAPEUTIC ACTIVITIES: CPT

## 2023-08-28 NOTE — PROGRESS NOTES
[address] [facility],[address] [facility]   [address] [facility] [address] [facility]. [place], [address]  Phone: [phone] Fax: [phone]  PROGRESS NOTE  Patient Name: Moshe Dixon : 1955   Treatment/Medical Diagnosis: Right knee pain [M25.561]   Referral Source: Caitlyn Castorena PA-C Payor: Payor: Alcides Arauz / Plan: [facility] / Product Type: *No Product type* /    Date of Initial Visit: 23 Attended Visits: 6 Missed Visits: [address], [place] [address] is a 70 y.o.  yo female with Dx of Right knee pain [M25.561]. Treatment has consisted of therapeutic exercise, therapeutic activity, neuromuscular re-education, gait training, self care education and physical agent/modality to improve right knee pain and function. CURRENT STATUS  Patient has attended 6 of 7 scheduled sessions from 23-23 with good progress toward goals. Patient progressing well with significant improvement in pain and increased function with ADLs. Persistent intermittent \"catch\" in right knee if patient \"steps wrong. \" Discussed at length benefits of knee replacment in [place] of this year, versus waiting for surgery. Encouraged patient to discuss further with surgeon. Subjective:  Reported Improvement: 100%  Pain: Average 0/10   Best 0/10   Worst 6/10  Reported Functional Deficits: cautious ambulation, intermittent knee pain \"stepping wrong\"  Reported Functional Improvements: intermittent knee pain improved to once a day if step wrong or after an exercises, improved walking, stairs, squatting    Objective:  AROM: Right knee 3-120 degrees    Quad Set: good, Improved quad set and VMO activation    Edema: Right knee mid-patellar girth 36.5 cm    TU seconds    Balance: SLS: right 30 seconds    5 X STS: 10 seconds    GOAL ASSESSMENT   Short Term Goals: To be accomplished in 4 treatments  Patient will report daily compliance with HEP to improve tolerance to ADLs and ambulation.   Status at
increase ROM, strength, coordination, balance, and proprioception to improve patient's ability to progress to PLOF and address remaining functional goals. (see flow sheet as applicable)     Details if applicable:  reassessment; right LE strengthening, ROM     12 8 [id number] Therapeutic Activity (timed):  use of dynamic activities replicating functional movements to increase ROM, strength, coordination, balance, and proprioception in order to improve patient's ability to progress to PLOF and address remaining functional goals. (see flow sheet as applicable)     Details if applicable:  TM, squats, stairs     12 10 44922 Neuromuscular Re-Education (timed):  improve balance, coordination, kinesthetic sense, posture, core stability and proprioception to improve patient's ability to develop conscious control of individual muscles and awareness of position of extremities in order to progress to PLOF and address remaining functional goals. (see flow sheet as applicable)     Details if applicable: quad re-ed           [x]  Patient Education billed concurrently with other procedures   [x] Review HEP    [] Progressed/Changed HEP, detail:    [] Other detail:       Objective Information/Functional Measures/Assessment    -See PN    Patient will continue to benefit from skilled PT / OT services to modify and progress therapeutic interventions, analyze and address functional mobility deficits, analyze and address ROM deficits, analyze and address strength deficits, analyze and address soft tissue restrictions, analyze and cue for proper movement patterns, analyze and modify for postural abnormalities, analyze and address imbalance/dizziness, and instruct in home and community integration to address functional deficits and attain remaining goals. Progress toward goals / Updated goals:  [x]  See Progress Note/Recertification   Long Term Goals:  To be accomplished in 10 treatments  Patient will report pain 2/10 at worst to improve

## 2023-08-31 ENCOUNTER — HOSPITAL ENCOUNTER (OUTPATIENT)
Facility: HOSPITAL | Age: 68
Setting detail: RECURRING SERIES
End: 2023-08-31
Payer: COMMERCIAL

## 2023-08-31 PROCEDURE — 97112 NEUROMUSCULAR REEDUCATION: CPT

## 2023-08-31 PROCEDURE — 97110 THERAPEUTIC EXERCISES: CPT

## 2023-08-31 NOTE — PROGRESS NOTES
PHYSICAL / OCCUPATIONAL THERAPY - DAILY TREATMENT NOTE (updated )    Patient Name: Jazzy Almonte    Date: 2023    : 1955  Insurance: Payor: Misty Course / Plan: Paramjit Benz / Product Type: *No Product type* /      Patient  verified yes     Visit #   Current / Total 1 10 Total Time   Time   In / Out 1:11 1:45 34   Pain   In / Out 2 2    Subjective Functional Status/Changes: Pt reports she is still not sure whether she should get a knee replacement or not. TREATMENT AREA =  Right knee pain [M25.561]    OBJECTIVE  34  Total 29  Total MC BC Totals Reminder: bill using total billable min of TIMED therapeutic procedures (example: do not include dry needle or estim unattended, both untimed codes, in totals to left)  8-22 min = 1 unit; 23-37 min = 2 units; 38-52 min = 3 units; 53-67 min = 4 units; 68-82 min = 5 units   Tx Min Billable or 1:1 Min (if diff from Tx Min) Procedure, Rationale, Specifics   20 15 [id number] Therapeutic Exercise (timed):  increase ROM, strength, coordination, balance, and proprioception to improve patient's ability to progress to PLOF and address remaining functional goals. (see flow sheet as applicable)   Details if applicable:        [id number] Neuromuscular Re-Education (timed):  improve balance, coordination, kinesthetic sense, posture, core stability and proprioception to improve patient's ability to develop conscious control of individual muscles and awareness of position of extremities in order to progress to PLOF and address remaining functional goals.  (see flow sheet as applicable)     Details if applicable:             [x]  Patient Education billed concurrently with other procedures   [x] Review HEP    [] Progressed/Changed HEP, detail:    [] Other detail:       Objective Information/Functional Measures/Assessment    -Patient reports sharp pain and sliding sensation at medial knee with attempts at closed chain knee extension strengthening off 2\" step for small AROM step